# Patient Record
Sex: FEMALE | Race: BLACK OR AFRICAN AMERICAN | NOT HISPANIC OR LATINO | Employment: PART TIME | URBAN - NONMETROPOLITAN AREA
[De-identification: names, ages, dates, MRNs, and addresses within clinical notes are randomized per-mention and may not be internally consistent; named-entity substitution may affect disease eponyms.]

---

## 2019-10-01 ENCOUNTER — OFFICE VISIT (OUTPATIENT)
Dept: FAMILY MEDICINE CLINIC | Facility: CLINIC | Age: 49
End: 2019-10-01

## 2019-10-01 VITALS
HEIGHT: 69 IN | TEMPERATURE: 98.4 F | DIASTOLIC BLOOD PRESSURE: 80 MMHG | WEIGHT: 226 LBS | OXYGEN SATURATION: 96 % | BODY MASS INDEX: 33.47 KG/M2 | HEART RATE: 92 BPM | SYSTOLIC BLOOD PRESSURE: 118 MMHG

## 2019-10-01 DIAGNOSIS — F32.81 PMDD (PREMENSTRUAL DYSPHORIC DISORDER): ICD-10-CM

## 2019-10-01 DIAGNOSIS — E28.2 PCOS (POLYCYSTIC OVARIAN SYNDROME): ICD-10-CM

## 2019-10-01 DIAGNOSIS — Z76.89 ENCOUNTER TO ESTABLISH CARE WITH NEW DOCTOR: Primary | ICD-10-CM

## 2019-10-01 DIAGNOSIS — E66.09 CLASS 1 OBESITY DUE TO EXCESS CALORIES WITHOUT SERIOUS COMORBIDITY WITH BODY MASS INDEX (BMI) OF 33.0 TO 33.9 IN ADULT: ICD-10-CM

## 2019-10-01 DIAGNOSIS — N92.0 MENORRHAGIA WITH REGULAR CYCLE: ICD-10-CM

## 2019-10-01 PROCEDURE — 99203 OFFICE O/P NEW LOW 30 MIN: CPT | Performed by: FAMILY MEDICINE

## 2019-10-01 RX ORDER — FLUOXETINE HYDROCHLORIDE 20 MG/1
20 CAPSULE ORAL DAILY
Qty: 30 CAPSULE | Refills: 2 | Status: SHIPPED | OUTPATIENT
Start: 2019-10-01 | End: 2020-03-03

## 2019-10-01 RX ORDER — METFORMIN HYDROCHLORIDE 500 MG/1
500 TABLET, EXTENDED RELEASE ORAL
Qty: 30 TABLET | Refills: 2 | Status: SHIPPED | OUTPATIENT
Start: 2019-10-01 | End: 2020-05-12

## 2019-10-01 NOTE — PROGRESS NOTES
Subjective   Alberto Moser is a 48 y.o. female who presents as a new patient to [facility]. Mrs. Moser report no remarkable past medical history or chronic conditions. She is concerned today about her ability to focus and and associated fatigue. Since she was a child she's had issues with staying on task and often bounces around from project to project or activity to activity. She says she leaves things unfinished around the house and can't commit to finishing anything on time.  She says it often interferes with her sleep because she can't stop thinking, and is easily distractible - both her  and her manager have mentioned it. She denies mood swings, gait disturbance, visual disturbance, balance problems, and weakness. She did have a trial of phentermine in April from an urgent care that helped her lose weight and maintain more focus, she's not sure how much it helped vs. possible placebo effect.     She also complains of weight problems. She's had issues with gaining weight over the past year, and feels it is contributing to her other problems and her self-image. She hasn't tried any life-modifications. 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 Her only exercise is walking around the unit she works. Over the past year her menstrual periods have also progressed. She has 28 day cycles with 7 day periods with excessive bleeding (>1 pad / hour) and sever pain. She also reports headaches, nausea, irritability the week prior to her period and through her period. Her periods have always been bad but have worsened over the past year. Ibuprofen, ice, position changes do not help. She denies any spotting or d/c, constipation/diarrhea, or vomiting. She has also noticed some hirsutism (facial hair) since gaining weight.      Past Medical Hx:  Past Medical History:   Diagnosis Date   •  "Spina bifida occulta Congenital       Past Surgical Hx:  Past Surgical History:   Procedure Laterality Date   • CHOLECYSTECTOMY  2009   • LAPAROSCOPIC TUBAL LIGATION Bilateral 1998    After third child/fourth pregnancy   • TONSILLECTOMY AND ADENOIDECTOMY Bilateral Childhood       Health Maintenance:  Health Maintenance   Topic Date Due   • ANNUAL PHYSICAL  11/18/1975   • TDAP/TD VACCINES (1 - Tdap) 11/18/1991   • INFLUENZA VACCINE  08/01/2019   • PAP SMEAR  10/01/2019       Current Meds:    Current Outpatient Medications:   •  FLUoxetine (PROzac) 20 MG capsule, Take 1 capsule by mouth Daily., Disp: 30 capsule, Rfl: 2  •  metFORMIN ER (GLUCOPHAGE-XR) 500 MG 24 hr tablet, Take 1 tablet by mouth Daily With Breakfast., Disp: 30 tablet, Rfl: 2    Allergies:  Patient has no known allergies.    Family Hx:  Family History   Problem Relation Age of Onset   • Hypertension Mother    • Cancer Mother 30        \"bone\"   • Hypothyroidism Mother    • Hypertension Father    • Cancer Father 65        \"lung\"   • Seizures Brother         Social History:  Social History     Socioeconomic History   • Marital status:      Spouse name: Not on file   • Number of children: 3   • Years of education: Not on file   • Highest education level: Not on file   Occupational History   • Occupation: Nurse, Critical care     Employer: [employer]   Tobacco Use   • Smoking status: Light Tobacco Smoker     Types: Cigarettes   • Smokeless tobacco: Never Used   • Tobacco comment: social smoker   Substance and Sexual Activity   • Alcohol use: No     Frequency: Never   • Drug use: No   • Sexual activity: Yes     Partners: Male     Birth control/protection: Tubal ligation     Comment:  of 17 years   Lifestyle   • Physical activity:     Days per week: 0 days     Minutes per session: 0 min   • Stress: Rather much       Review of Systems  Review of Systems   Constitutional: Positive for fatigue and unexpected weight change. " "Negative for chills and fever.   HENT: Negative for ear pain, hearing loss, sore throat and trouble swallowing.    Eyes: Negative for pain, itching and visual disturbance.   Respiratory: Negative for cough and shortness of breath.    Cardiovascular: Negative for chest pain and leg swelling.   Gastrointestinal: Negative for abdominal pain, blood in stool, constipation, diarrhea and nausea.   Endocrine: Negative for cold intolerance and heat intolerance.   Genitourinary: Negative for difficulty urinating, dyspareunia, dysuria, vaginal discharge and vaginal pain.   Musculoskeletal: Negative for back pain, gait problem and neck stiffness.   Skin: Negative for color change and rash.   Neurological: Negative for syncope, speech difficulty, weakness and light-headedness.   Psychiatric/Behavioral: Positive for agitation, decreased concentration and sleep disturbance. Negative for self-injury.            Objective:     /80 (BP Location: Left arm)   Pulse 92   Temp 98.4 °F (36.9 °C)   Ht 175.3 cm (69\")   Wt 103 kg (226 lb)   LMP 09/01/2019   SpO2 96%   BMI 33.37 kg/m²       Physical Exam   Constitutional: She is oriented to person, place, and time. No distress.   HENT:   Right Ear: External ear normal.   Left Ear: External ear normal.   Nose: Nose normal.   Eyes: Conjunctivae and EOM are normal.   Neck: No thyromegaly present.   Cardiovascular: Normal rate, regular rhythm and normal heart sounds. Exam reveals no friction rub.   No murmur heard.  Pulmonary/Chest: Effort normal and breath sounds normal. No respiratory distress.   Abdominal: Soft. Bowel sounds are normal. She exhibits no distension. There is no tenderness. There is no guarding.   Musculoskeletal: Normal range of motion. She exhibits no edema.   Lymphadenopathy:     She has no cervical adenopathy.   Neurological: She is alert and oriented to person, place, and time.   Skin: Skin is warm and dry. Capillary refill takes less than 2 seconds. She is not " diaphoretic.       Assessment/Plan:     Alberto Moser is a 49 y/o F with attention and irritability issues that have progressed over the past year. She has no remarkable medical history.     1. Weight: Patient appears most concerned with her weight, and thinks most of her problems stem from or are worsened by her weight and wants to change it. We spent most of the visit talking about it and the following lifestyle modifications were set as goals that she felt she could achieve: 1. Drink less coffee and specifically less creamer in coffee 2. Drink less sweet tea and soda, drink more water - no more than two glasses a day 3. Exercise at least 30 minutes a day - walking the dog. Weight loss will likely help her other symptoms. Will F/U for adherence at next visit and assess weight then.     2. Irritability and Menorrhagia: Patient's bleeding is considered excessive and symptoms (HA, nausea, irritability/mood swings) before and during periods align with possible PMDD. PCOS is also likely given hirsutism, habitus, and age. Scheduling a transvaginal ultrasound to assess for possible PCOS, fibroids, other anatomical outliers as well as a pap. Familial hirsutism also a possibility given sister's symptoms described by the patient. Given smoking habit and age will not prescribe OCPs for possible PMDD, PCOS, or menorrhagia. Going to start Metformin for possible PCOS and weight loss help, as well as Fluoxetine given PMDD most likely diagnosis as well as help with mood stability. Patient appeared wary of Fluoxetine but agreed to try it, and did not want to start spironolactone or aldactone for hirsutism symptoms yet. She does not have an OBG, and does not want an OBG. Will reassess after ultrasound/pap.

## 2019-10-01 NOTE — PROGRESS NOTES
Subjective:     Chief Complaint:   Chief Complaint   Patient presents with   • Establish Care   • Annual Exam       Alberto Moser is a 48 y.o. female who presents for establish care visit. Patient has a history of obesity and states that she has a longstanding history of difficulty losing weight. She states that due to her work schedule, her dietary habits fluctuate and she will try to snack intermittently throughout work shifts. 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 She states that she has tried to lose weight and unfortunately has not had much success. She reports that she has had phentermine in the past, however this just made her have more energy and did not contribute much to weight loss. Patient states that she has had issues with her menstrual cycles that have not been getting any better. Patient has regular menstrual cycles, however she reports that during menstruation her periods have gotten heavy and has had to go through boxes of tampons. She states that at times, she will experience pain with her menstruation. She has tried OTC anti-inflammatories with little relief of symptoms. With her symptoms, she notices that she is having an increase in facial hair. Patient reports that she is having issues with her mood for the past several months as well. She states that she will get more irritable and has issues with a dysphoric mood. She states that this does not happen everyday, and notices that these symptoms will occur about a week before her periods start. Patient is overdue for a screening PAP smear. She has had her last one about 10 years, has not had any abnormal pap smears in the past.     Past Medical Hx:  Past Medical History:   Diagnosis Date   • Spina bifida occulta Congenital       Past Surgical Hx:  Past Surgical History:   Procedure Laterality Date   • CHOLECYSTECTOMY  2009   • LAPAROSCOPIC TUBAL LIGATION  "Bilateral 1998    After third child/fourth pregnancy   • TONSILLECTOMY AND ADENOIDECTOMY Bilateral Childhood       Health Maintenance:  Health Maintenance   Topic Date Due   • ANNUAL PHYSICAL  11/18/1975   • TDAP/TD VACCINES (1 - Tdap) 11/18/1991   • INFLUENZA VACCINE  08/01/2019   • PAP SMEAR  10/01/2019       Current Meds:    Current Outpatient Medications:   •  FLUoxetine (PROzac) 20 MG capsule, Take 1 capsule by mouth Daily., Disp: 30 capsule, Rfl: 2  •  metFORMIN ER (GLUCOPHAGE-XR) 500 MG 24 hr tablet, Take 1 tablet by mouth Daily With Breakfast., Disp: 30 tablet, Rfl: 2    Allergies:  Patient has no known allergies.    Family Hx:  Family History   Problem Relation Age of Onset   • Hypertension Mother    • Cancer Mother 30        \"bone\"   • Hypothyroidism Mother    • Hypertension Father    • Cancer Father 65        \"lung\"   • Seizures Brother         Social History:  Social History     Socioeconomic History   • Marital status:      Spouse name: Not on file   • Number of children: 3   • Years of education: Not on file   • Highest education level: Not on file   Occupational History   • Occupation: Nurse, Critical care     Employer: [employer]   Tobacco Use   • Smoking status: Light Tobacco Smoker     Types: Cigarettes   • Smokeless tobacco: Never Used   • Tobacco comment: social smoker   Substance and Sexual Activity   • Alcohol use: No     Frequency: Never   • Drug use: No   • Sexual activity: Yes     Partners: Male     Birth control/protection: Tubal ligation     Comment:  of 17 years   Lifestyle   • Physical activity:     Days per week: 0 days     Minutes per session: 0 min   • Stress: Rather much       Review of Systems  Review of Systems   Constitutional: Positive for unexpected weight change. Negative for appetite change, chills and fever.   HENT: Negative for congestion, ear pain, rhinorrhea, sneezing and sore throat.    Respiratory: Negative for cough and shortness of " "breath.    Cardiovascular: Negative for chest pain, palpitations and leg swelling.   Gastrointestinal: Negative for diarrhea, nausea and vomiting.   Endocrine: Negative for polyphagia and polyuria.   Musculoskeletal: Negative for back pain and neck pain.   Skin: Negative for color change and rash.   Neurological: Negative for dizziness, syncope and weakness.   Psychiatric/Behavioral: Positive for decreased concentration and dysphoric mood. Negative for agitation and confusion.       Objective:     /80 (BP Location: Left arm)   Pulse 92   Temp 98.4 °F (36.9 °C)   Ht 175.3 cm (69\")   Wt 103 kg (226 lb)   LMP 09/01/2019   SpO2 96%   BMI 33.37 kg/m²     Physical Exam   Constitutional: She is oriented to person, place, and time. She appears well-developed and well-nourished.   HENT:   Head: Normocephalic and atraumatic.   Right Ear: External ear normal.   Left Ear: External ear normal.   Nose: Nose normal.   Mouth/Throat: Oropharynx is clear and moist.   Eyes: Conjunctivae and EOM are normal. Pupils are equal, round, and reactive to light. Right eye exhibits no discharge. Left eye exhibits no discharge. No scleral icterus.   Neck: Normal range of motion. Neck supple.   Cardiovascular: Normal rate, regular rhythm and normal heart sounds. Exam reveals no gallop and no friction rub.   No murmur heard.  Pulmonary/Chest: Effort normal and breath sounds normal. No respiratory distress. She has no wheezes. She has no rales.   Abdominal: Soft. Bowel sounds are normal. There is no tenderness.   Musculoskeletal: Normal range of motion. She exhibits no edema.   Neurological: She is alert and oriented to person, place, and time.   Skin: Skin is warm and dry.   Psychiatric: She has a normal mood and affect. Her behavior is normal.   Vitals reviewed.       Assessment/Plan:     Alberto was seen today for establish care and annual exam.    Diagnoses and all orders for this visit:    Encounter to establish care with new " doctor    PMDD (premenstrual dysphoric disorder)  -     FLUoxetine (PROzac) 20 MG capsule; Take 1 capsule by mouth Daily.    Menorrhagia with regular cycle  -     US Non-ob Transvaginal; Future    Class 1 obesity due to excess calories without serious comorbidity with body mass index (BMI) of 33.0 to 33.9 in adult  -     metFORMIN ER (GLUCOPHAGE-XR) 500 MG 24 hr tablet; Take 1 tablet by mouth Daily With Breakfast.    PCOS (polycystic ovarian syndrome)  -     metFORMIN ER (GLUCOPHAGE-XR) 500 MG 24 hr tablet; Take 1 tablet by mouth Daily With Breakfast.        Return in about 4 weeks (around 10/29/2019) for Next scheduled follow up.    GOALS:  Improve diet and exercise  BARRIERS TO GOALS:  Work schedule can interfere with weight issues    Preventative:  Female Preventative: PAP is due - will reschedule a pap smear appointment at a later date   up to date and documented   Recommended:none  Tobacco: light smoker, patient has had counseling to quit smoking  Alcohol: does not drink  Diet/Exercise: eat more fruits and vegetables, decrease soda or juice intake, increase water intake, increase physical activity, reduce screen time, reduce portion size, cut out extra servings, reduce fast food intake, family to eat at dinner table more often, keep TV off during meals, plan meals, eat breakfast and have 3 meals a day    RISK SCORE: 3      This document has been electronically signed by Maikel Carballo MD on October 1, 2019 3:58 PM

## 2020-03-03 ENCOUNTER — TELEPHONE (OUTPATIENT)
Dept: FAMILY MEDICINE CLINIC | Facility: CLINIC | Age: 50
End: 2020-03-03

## 2020-03-03 DIAGNOSIS — F32.81 PMDD (PREMENSTRUAL DYSPHORIC DISORDER): ICD-10-CM

## 2020-03-03 RX ORDER — FLUOXETINE HYDROCHLORIDE 20 MG/1
20 CAPSULE ORAL DAILY
Qty: 30 CAPSULE | Refills: 2 | Status: SHIPPED | OUTPATIENT
Start: 2020-03-03 | End: 2020-08-28

## 2020-03-03 NOTE — TELEPHONE ENCOUNTER
CALLED PATIENT TO GET HER AN APT WITH DR BARRON       NO ANSWER LEFT VOICEMAIL FOR PATIENT TO CALL BACK    IF / WHEN PATIENT CALLS BACK PLEASE MAKE APT AT HER EARLIEST CONVENIENCE. SHE HAS NOT BEEN SEEN SINCE OCT 2019     RAMONA SWEET

## 2020-05-12 DIAGNOSIS — E66.09 CLASS 1 OBESITY DUE TO EXCESS CALORIES WITHOUT SERIOUS COMORBIDITY WITH BODY MASS INDEX (BMI) OF 33.0 TO 33.9 IN ADULT: ICD-10-CM

## 2020-05-12 DIAGNOSIS — E28.2 PCOS (POLYCYSTIC OVARIAN SYNDROME): ICD-10-CM

## 2020-05-12 RX ORDER — METFORMIN HYDROCHLORIDE 500 MG/1
500 TABLET, EXTENDED RELEASE ORAL
Qty: 30 TABLET | Refills: 2 | Status: SHIPPED | OUTPATIENT
Start: 2020-05-12 | End: 2020-09-25

## 2020-08-24 DIAGNOSIS — F32.81 PMDD (PREMENSTRUAL DYSPHORIC DISORDER): ICD-10-CM

## 2020-08-28 RX ORDER — FLUOXETINE HYDROCHLORIDE 20 MG/1
20 CAPSULE ORAL DAILY
Qty: 30 CAPSULE | Refills: 2 | Status: SHIPPED | OUTPATIENT
Start: 2020-08-28 | End: 2020-09-25

## 2020-09-25 ENCOUNTER — OFFICE VISIT (OUTPATIENT)
Dept: FAMILY MEDICINE CLINIC | Facility: CLINIC | Age: 50
End: 2020-09-25

## 2020-09-25 VITALS — HEIGHT: 69 IN | BODY MASS INDEX: 35.92 KG/M2 | WEIGHT: 242.5 LBS

## 2020-09-25 DIAGNOSIS — E66.3 PATIENT OVERWEIGHT: Primary | ICD-10-CM

## 2020-09-25 PROCEDURE — 99443 PR PHYS/QHP TELEPHONE EVALUATION 21-30 MIN: CPT | Performed by: NURSE PRACTITIONER

## 2020-09-25 RX ORDER — PHENTERMINE HYDROCHLORIDE 37.5 MG/1
37.5 CAPSULE ORAL EVERY MORNING
Qty: 30 CAPSULE | Refills: 0 | Status: SHIPPED | OUTPATIENT
Start: 2020-09-25

## 2020-09-25 RX ORDER — HALOPERIDOL 0.5 MG/1
TABLET ORAL
Qty: 60 TABLET | Refills: 0 | Status: SHIPPED | OUTPATIENT
Start: 2020-09-25

## 2021-05-26 DIAGNOSIS — E28.2 PCOS (POLYCYSTIC OVARIAN SYNDROME): ICD-10-CM

## 2021-05-26 DIAGNOSIS — E66.09 CLASS 1 OBESITY DUE TO EXCESS CALORIES WITHOUT SERIOUS COMORBIDITY WITH BODY MASS INDEX (BMI) OF 33.0 TO 33.9 IN ADULT: ICD-10-CM

## 2021-05-26 RX ORDER — METFORMIN HYDROCHLORIDE 500 MG/1
500 TABLET, EXTENDED RELEASE ORAL
Qty: 30 TABLET | Refills: 2 | OUTPATIENT
Start: 2021-05-26

## 2021-05-26 NOTE — TELEPHONE ENCOUNTER
Called patient to advise we have received request for medication refills.     NO ANSWER   LEFT VCML     WE NEED TO VERIFY HER PCP.      RAMNOA DURANT

## 2022-10-04 ENCOUNTER — OFFICE VISIT (OUTPATIENT)
Dept: PODIATRY | Facility: CLINIC | Age: 52
End: 2022-10-04

## 2022-10-04 VITALS — WEIGHT: 242 LBS | HEART RATE: 80 BPM | HEIGHT: 69 IN | OXYGEN SATURATION: 98 % | BODY MASS INDEX: 35.84 KG/M2

## 2022-10-04 DIAGNOSIS — M79.671 BILATERAL FOOT PAIN: Primary | ICD-10-CM

## 2022-10-04 DIAGNOSIS — M79.672 BILATERAL FOOT PAIN: Primary | ICD-10-CM

## 2022-10-04 DIAGNOSIS — M20.10 ACQUIRED HALLUX INTERPHALANGEUS, UNSPECIFIED LATERALITY: ICD-10-CM

## 2022-10-04 DIAGNOSIS — M20.12 VALGUS DEFORMITY OF BOTH GREAT TOES: ICD-10-CM

## 2022-10-04 DIAGNOSIS — M20.11 VALGUS DEFORMITY OF BOTH GREAT TOES: ICD-10-CM

## 2022-10-04 DIAGNOSIS — M72.2 PLANTAR FASCIITIS: ICD-10-CM

## 2022-10-04 PROCEDURE — 99204 OFFICE O/P NEW MOD 45 MIN: CPT | Performed by: PODIATRIST

## 2022-10-04 PROCEDURE — 20550 NJX 1 TENDON SHEATH/LIGAMENT: CPT | Performed by: PODIATRIST

## 2022-10-04 RX ORDER — MELOXICAM 15 MG/1
15 TABLET ORAL DAILY
Qty: 30 TABLET | Refills: 1 | Status: SHIPPED | OUTPATIENT
Start: 2022-10-04

## 2022-10-04 NOTE — PROGRESS NOTES
"Alberto Moser  1970  51 y.o. female      10/04/2022      Chief Complaint   Patient presents with   • Left Foot - Follow-up   • Right Foot - Follow-up       History of Present Illness    Alberto Moser is a 51 y.o.female presents to clinic with bilateral foot pain.       Past Medical History:   Diagnosis Date   • Spina bifida occulta Congenital         Past Surgical History:   Procedure Laterality Date   • CHOLECYSTECTOMY  2009   • LAPAROSCOPIC TUBAL LIGATION Bilateral 1998    After third child/fourth pregnancy   • TONSILLECTOMY AND ADENOIDECTOMY Bilateral Childhood         Family History   Problem Relation Age of Onset   • Hypertension Mother    • Cancer Mother 30        \"bone\"   • Hypothyroidism Mother    • Hypertension Father    • Cancer Father 65        \"lung\"   • Seizures Brother        No Known Allergies    Social History     Socioeconomic History   • Marital status:    • Number of children: 3   Tobacco Use   • Smoking status: Former     Types: Cigarettes   • Smokeless tobacco: Never   • Tobacco comments:     social smoker   Substance and Sexual Activity   • Alcohol use: No   • Drug use: No   • Sexual activity: Yes     Partners: Male     Birth control/protection: Tubal ligation     Comment:  of 17 years         Current Outpatient Medications   Medication Sig Dispense Refill   • haloperidol (HALDOL) 0.5 MG tablet Take 1/2-2 tablets tid prn as needed. 60 tablet 0   • phentermine 37.5 MG capsule Take 1 capsule by mouth Every Morning. 30 capsule 0   • meloxicam (MOBIC) 15 MG tablet Take 1 tablet by mouth Daily. 30 tablet 1     No current facility-administered medications for this visit.       Review of Systems   Constitutional: Negative.    HENT: Negative.    Eyes: Negative.    Respiratory: Negative.    Cardiovascular: Negative.    Gastrointestinal: Negative.    Endocrine: Negative.    Genitourinary: Negative.    Musculoskeletal:        Foot pain   Skin: Negative.  " "  Allergic/Immunologic: Negative.    Neurological: Negative.    Hematological: Negative.    Psychiatric/Behavioral: Negative.          OBJECTIVE    Pulse 80   Ht 175.3 cm (69\")   Wt 110 kg (242 lb)   SpO2 98%   BMI 35.74 kg/m²     Physical Exam  Vitals reviewed.   Constitutional:       General: She is not in acute distress.     Appearance: She is well-developed.   HENT:      Head: Normocephalic and atraumatic.      Nose: Nose normal.   Eyes:      Conjunctiva/sclera: Conjunctivae normal.      Pupils: Pupils are equal, round, and reactive to light.   Cardiovascular:      Pulses:           Dorsalis pedis pulses are 2+ on the right side.        Posterior tibial pulses are 2+ on the right side.   Pulmonary:      Effort: Pulmonary effort is normal. No respiratory distress.      Breath sounds: No wheezing.   Musculoskeletal:      Right foot: Decreased range of motion. Deformity and bunion present.      Left foot: Decreased range of motion. Deformity and bunion present.   Feet:      Right foot:      Skin integrity: Callus present.      Left foot:      Skin integrity: Callus present.      Comments: Pain on palpation to the medial tubercle bilateral calcaneus.  Negative squeeze test.    Moderate hallux valgus deformity bilateral.    Hallux interphalangeus bilateral  Skin:     General: Skin is warm and dry.      Capillary Refill: Capillary refill takes less than 2 seconds.   Neurological:      Mental Status: She is alert and oriented to person, place, and time.   Psychiatric:         Behavior: Behavior normal.         Thought Content: Thought content normal.                Procedures    Plantar Fasciits Injection  Date/Time: 10/04/2022  Performed by: WAQAS ZAPIEN  Authorized by: WAQAS ZAPIEN   Consent: Verbal consent obtained. Written consent obtained.  Risks and benefits: risks, benefits and alternatives were discussed  Consent given by: patient  Patient identity confirmed: verbally with patient  Indications: pain " relief  Nerve block body site: bilateral  heel.  Sedation:  Patient sedated: no    Patient position: sitting  Needle size: 27 G  Local anesthetic: 0.5% Marcaine plain, Kenalog 40 mg/ml , Decadron 4 mg/mL.   Outcome: pain improved  Patient tolerance: Patient tolerated the procedure well with no immediate complications     ASSESSMENT AND PLAN    Diagnoses and all orders for this visit:    1. Bilateral foot pain (Primary)  -     XR foot 3+ vw bilateral; Future    2. Plantar fasciitis    3. Valgus deformity of both great toes  -     Case Request; Standing  -     ceFAZolin (ANCEF) 2 g in sodium chloride 0.9 % 100 mL IVPB    4. Acquired hallux interphalangeus, unspecified laterality    Other orders  -     meloxicam (MOBIC) 15 MG tablet; Take 1 tablet by mouth Daily.  Dispense: 30 tablet; Refill: 1  -     Follow Anesthesia Guidelines / Standing Orders; Standing  -     Verify NPO Status; Standing  -     Obtain informed consent (if not collected inpatient or PAT); Standing  -     Notify Physician - Standard; Standing  -     Follow Anesthesia Guidelines / Standing Orders; Future        - Patient examined and evaluated  -Radiographs taken reviewed.  No acute pathology.  -Etiology and treatment of plantar fasciitis discussed in detail.  Steroid injection bilateral heels today.  Rx for meloxicam.  Recommended stretching, icing and over-the-counter arch supports.  -Discussed conservative and surgical treatment options for hallux valgus and hallux interphalangeus deformities bilateral.  Patient has tried wide toe box shoes and arch supports.  She continues to have pain while wearing shoe gear.  She wishes to proceed with surgery.  -Surgical plan is bunionectomy right foot with hallux interphalangeal joint arthrodesis and all other indicated procedures.  -Risks and benefits of the procedure including but not limited to postoperative infection, incisional dehiscence, numbness, swelling, residual pain and postoperative blood clot  discussed in detail.  No guarantees were given or implied.  -Tentative date for the surgery January 12, 2023  -All questions were answered  -Recheck following surgery             This document has been electronically signed by Sebastián Noriega DPM on October 8, 2022 10:03 CDT     10/8/2022  10:03 CDT

## 2022-10-05 ENCOUNTER — PATIENT ROUNDING (BHMG ONLY) (OUTPATIENT)
Dept: PODIATRY | Facility: CLINIC | Age: 52
End: 2022-10-05

## 2022-10-05 NOTE — PROGRESS NOTES
October 5, 2022    Hello, may I speak with Alberto Moser?    My name is DESIRAE PERALES    I am  with [facility]  [address]   [place] [address] [phone].    Before we get started may I verify your date of birth? 1970    I am calling to officially welcome you to our practice and ask about your recent visit. Is this a good time to talk? yes    Tell me about your visit with us. What things went well?  PATIENT LIKED DR ZAPIEN, SAID THAT DR ZAPIEN AND HIS STAFF WAS VERY NICE.       We're always looking for ways to make our patients' experiences even better. Do you have recommendations on ways we may improve?  yes, MOVE LOCATION, NEED TO GET OUT OF THE BASEMENT, RECEPTION DOWN HERE IS HORRIBLE, AND GET DR ZAPIEN SOME HELP TO SEE HIS PATIENTS.     Overall were you satisfied with your first visit to our practice? yes       I appreciate you taking the time to speak with me today. Is there anything else I can do for you? no      Thank you, and have a great day.

## 2022-10-10 PROBLEM — M20.11 VALGUS DEFORMITY OF BOTH GREAT TOES: Status: ACTIVE | Noted: 2022-10-10

## 2022-10-10 PROBLEM — M20.12 VALGUS DEFORMITY OF BOTH GREAT TOES: Status: ACTIVE | Noted: 2022-10-10

## 2022-10-10 RX ORDER — TRIAMCINOLONE ACETONIDE 40 MG/ML
40 INJECTION, SUSPENSION INTRA-ARTICULAR; INTRAMUSCULAR ONCE
Status: COMPLETED | OUTPATIENT
Start: 2022-10-10 | End: 2022-10-10

## 2022-10-10 RX ORDER — DEXAMETHASONE SODIUM PHOSPHATE 4 MG/ML
2 INJECTION, SOLUTION INTRA-ARTICULAR; INTRALESIONAL; INTRAMUSCULAR; INTRAVENOUS; SOFT TISSUE ONCE
Status: COMPLETED | OUTPATIENT
Start: 2022-10-10 | End: 2022-10-10

## 2022-10-10 RX ADMIN — DEXAMETHASONE SODIUM PHOSPHATE 2 MG: 4 INJECTION, SOLUTION INTRA-ARTICULAR; INTRALESIONAL; INTRAMUSCULAR; INTRAVENOUS; SOFT TISSUE at 10:37

## 2022-10-10 RX ADMIN — TRIAMCINOLONE ACETONIDE 40 MG: 40 INJECTION, SUSPENSION INTRA-ARTICULAR; INTRAMUSCULAR at 10:38

## 2023-01-09 ENCOUNTER — TELEPHONE (OUTPATIENT)
Dept: PODIATRY | Facility: CLINIC | Age: 53
End: 2023-01-09
Payer: COMMERCIAL

## 2023-01-09 NOTE — TELEPHONE ENCOUNTER
SCOTT, PT DIDN'T SHOW UP FOR PREOP APPT TODAY PER JACK.  SURGERY SCHEDULED FOR 1-12-23.   PT CALL BACK # [phone].   THANK YOU.

## 2023-01-09 NOTE — TELEPHONE ENCOUNTER
Spoke with lm, patient forgot she was having surgery and is going to cancel and reschedule for a later date.

## 2023-08-08 ENCOUNTER — OFFICE VISIT (OUTPATIENT)
Dept: PODIATRY | Facility: CLINIC | Age: 53
End: 2023-08-08
Payer: COMMERCIAL

## 2023-08-08 VITALS
SYSTOLIC BLOOD PRESSURE: 150 MMHG | OXYGEN SATURATION: 99 % | WEIGHT: 242 LBS | HEIGHT: 69 IN | DIASTOLIC BLOOD PRESSURE: 83 MMHG | BODY MASS INDEX: 35.84 KG/M2 | HEART RATE: 72 BPM

## 2023-08-08 DIAGNOSIS — M72.2 PLANTAR FASCIITIS: ICD-10-CM

## 2023-08-08 DIAGNOSIS — M79.671 BILATERAL FOOT PAIN: Primary | ICD-10-CM

## 2023-08-08 DIAGNOSIS — M76.61 ACHILLES TENDINITIS OF RIGHT LOWER EXTREMITY: ICD-10-CM

## 2023-08-08 DIAGNOSIS — M77.31 HEEL SPUR, RIGHT: ICD-10-CM

## 2023-08-08 DIAGNOSIS — M21.861 ACQUIRED POSTERIOR EQUINUS, RIGHT: ICD-10-CM

## 2023-08-08 DIAGNOSIS — M79.672 BILATERAL FOOT PAIN: Primary | ICD-10-CM

## 2023-08-08 NOTE — PROGRESS NOTES
"Alberto Moser  1970  52 y.o. female      08/08/2023        Chief Complaint   Patient presents with    Right Foot - Follow-up       History of Present Illness    Alberto Moser is a 52 y.o.female presents to clinic today to discuss right foot surgery. Patient has burning of the heel and bunion.        Past Medical History:   Diagnosis Date    Spina bifida occulta Congenital         Past Surgical History:   Procedure Laterality Date    CHOLECYSTECTOMY  2009    LAPAROSCOPIC TUBAL LIGATION Bilateral 1998    After third child/fourth pregnancy    TONSILLECTOMY AND ADENOIDECTOMY Bilateral Childhood         Family History   Problem Relation Age of Onset    Hypertension Mother     Cancer Mother 30        \"bone\"    Hypothyroidism Mother     Hypertension Father     Cancer Father 65        \"lung\"    Seizures Brother        No Known Allergies    Social History     Socioeconomic History    Marital status:     Number of children: 3   Tobacco Use    Smoking status: Former     Types: Cigarettes    Smokeless tobacco: Never    Tobacco comments:     social smoker   Substance and Sexual Activity    Alcohol use: No    Drug use: No    Sexual activity: Yes     Partners: Male     Birth control/protection: Tubal ligation     Comment:  of 17 years         Current Outpatient Medications   Medication Sig Dispense Refill    haloperidol (HALDOL) 0.5 MG tablet Take 1/2-2 tablets tid prn as needed. 60 tablet 0    meloxicam (MOBIC) 15 MG tablet Take 1 tablet by mouth Daily. 30 tablet 1    phentermine 37.5 MG capsule Take 1 capsule by mouth Every Morning. 30 capsule 0     No current facility-administered medications for this visit.       Review of Systems   Constitutional: Negative.    HENT: Negative.     Eyes: Negative.    Respiratory: Negative.     Cardiovascular: Negative.    Gastrointestinal: Negative.    Endocrine: Negative.    Genitourinary: Negative.    Musculoskeletal:         Foot pain   Skin: Negative. " "   Allergic/Immunologic: Negative.    Neurological: Negative.    Hematological: Negative.    Psychiatric/Behavioral: Negative.         OBJECTIVE    /83   Pulse 72   Ht 175.3 cm (69\")   Wt 110 kg (242 lb)   SpO2 99%   BMI 35.74 kg/mý     Physical Exam  Vitals reviewed.   Constitutional:       General: She is not in acute distress.     Appearance: She is well-developed.   HENT:      Head: Normocephalic and atraumatic.      Nose: Nose normal.   Eyes:      Conjunctiva/sclera: Conjunctivae normal.      Pupils: Pupils are equal, round, and reactive to light.   Cardiovascular:      Pulses:           Dorsalis pedis pulses are 2+ on the right side.        Posterior tibial pulses are 2+ on the right side.   Pulmonary:      Effort: Pulmonary effort is normal. No respiratory distress.      Breath sounds: No wheezing.   Musculoskeletal:      Right foot: Decreased range of motion. Deformity and bunion present.      Left foot: Decreased range of motion. Deformity and bunion present.   Feet:      Right foot:      Skin integrity: Callus present.      Left foot:      Skin integrity: Callus present.      Comments: Pain on palpation to the medial tubercle bilateral calcaneus.  Negative squeeze test.    Moderate hallux valgus deformity bilateral.    Hallux interphalangeus bilateral  Skin:     General: Skin is warm and dry.      Capillary Refill: Capillary refill takes less than 2 seconds.   Neurological:      Mental Status: She is alert and oriented to person, place, and time.   Psychiatric:         Behavior: Behavior normal.         Thought Content: Thought content normal.              Procedures      ASSESSMENT AND PLAN    Diagnoses and all orders for this visit:    1. Bilateral foot pain (Primary)  -     XR foot 3+ vw bilateral; Future    2. Plantar fasciitis    3. Achilles tendinitis of right lower extremity    4. Heel spur, right    5. Acquired posterior equinus, right        - Patient examined and evaluated  -Imaging " reviewed  -Discussed treatment options both conservative and surgical.  Patient is interested in proceeding with surgical intervention and will return after her vacation  -All questions answered  -Recheck when ready to schedule surgery.            This document has been electronically signed by Sebastián Noriega DPM on August 20, 2023 10:35 CDT     8/20/2023  10:35 CDT

## 2023-08-08 NOTE — LETTER
August 8, 2023     Patient: Alberto Moser   YOB: 1970   Date of Visit: 8/8/2023       To Whom It May Concern:    It is my medical opinion that Alberto Moser   is scheduled for surgery on 9/19/23 please allow for 10 weeks post op time off .           Sincerely,        Sebastián Noriega DPM    CC:   No Recipients

## 2023-08-11 ENCOUNTER — TELEPHONE (OUTPATIENT)
Dept: PODIATRY | Facility: CLINIC | Age: 53
End: 2023-08-11
Payer: COMMERCIAL

## 2023-08-11 NOTE — TELEPHONE ENCOUNTER
PT REQUESTS A CALL BACK RE LETTER SHE RECEIVED TUESDAY FOR HER WORK SAYS SHE WILL BE OFF FOR SURGERY ON 9-19-23 AND IT NEEDS TO SAY 10-19-23 AND PLEASE ALLOW 10 WEEKS POST OP TIME OFF.  CALL BACK # [phone].  THANK YOU.